# Patient Record
Sex: FEMALE | Race: WHITE | NOT HISPANIC OR LATINO | URBAN - METROPOLITAN AREA
[De-identification: names, ages, dates, MRNs, and addresses within clinical notes are randomized per-mention and may not be internally consistent; named-entity substitution may affect disease eponyms.]

---

## 2024-02-22 ENCOUNTER — HOSPITAL ENCOUNTER (OUTPATIENT)
Dept: MAMMOGRAPHY | Facility: CLINIC | Age: 54
Discharge: HOME | End: 2024-02-22
Attending: FAMILY MEDICINE | Admitting: FAMILY MEDICINE
Payer: COMMERCIAL

## 2024-02-22 DIAGNOSIS — Z12.31 VISIT FOR SCREENING MAMMOGRAM: ICD-10-CM

## 2024-02-22 PROCEDURE — 77063 BREAST TOMOSYNTHESIS BI: CPT

## 2024-06-21 ENCOUNTER — TRANSFERRED RECORDS (OUTPATIENT)
Dept: HEALTH INFORMATION MANAGEMENT | Facility: CLINIC | Age: 54
End: 2024-06-21
Payer: COMMERCIAL

## 2024-07-10 ENCOUNTER — MYC MEDICAL ADVICE (OUTPATIENT)
Dept: OBGYN | Facility: CLINIC | Age: 54
End: 2024-07-10

## 2024-07-16 ENCOUNTER — OFFICE VISIT (OUTPATIENT)
Dept: UROLOGY | Facility: CLINIC | Age: 54
End: 2024-07-16
Attending: OBSTETRICS & GYNECOLOGY
Payer: COMMERCIAL

## 2024-07-16 VITALS
WEIGHT: 206.1 LBS | HEIGHT: 69 IN | HEART RATE: 55 BPM | BODY MASS INDEX: 30.53 KG/M2 | SYSTOLIC BLOOD PRESSURE: 137 MMHG | DIASTOLIC BLOOD PRESSURE: 85 MMHG

## 2024-07-16 DIAGNOSIS — N32.81 OVERACTIVE BLADDER: Primary | ICD-10-CM

## 2024-07-16 DIAGNOSIS — N39.3 FEMALE STRESS INCONTINENCE: ICD-10-CM

## 2024-07-16 PROCEDURE — 87086 URINE CULTURE/COLONY COUNT: CPT | Performed by: OBSTETRICS & GYNECOLOGY

## 2024-07-16 PROCEDURE — 99213 OFFICE O/P EST LOW 20 MIN: CPT | Performed by: OBSTETRICS & GYNECOLOGY

## 2024-07-16 PROCEDURE — 99204 OFFICE O/P NEW MOD 45 MIN: CPT | Performed by: OBSTETRICS & GYNECOLOGY

## 2024-07-16 RX ORDER — PROGESTERONE 200 MG/1
CAPSULE ORAL
COMMUNITY
Start: 2024-06-05

## 2024-07-16 RX ORDER — ESTRADIOL 0.05 MG/D
PATCH, EXTENDED RELEASE TRANSDERMAL
COMMUNITY
Start: 2024-05-09

## 2024-07-16 NOTE — PATIENT INSTRUCTIONS
Thank you for trusting us with your care!     If you need to contact us for questions about:  Symptoms, Scheduling & Medical Questions; Non-urgent (2-3 day response) Roberth message, Urgent (needing response today) [phone] (if after 3:30pm next day response)   Prescriptions: Please call your Pharmacy   Billing: [place] [phone] or [facility]:[phone]

## 2024-07-16 NOTE — LETTER
7/16/2024       RE: Amelia Weeks  [address]  [facility] [address]     Dear Colleague,    Thank you for referring your patient, Amelia Weeks, to the [facility] [place] at [facility]. Please see a copy of my visit note below.    July 16, 2024    Referring Provider: Referred Self, MD  No address on file    Primary Care Provider: Fanny Valladares    CC: Urinary urgency, frequency    HPI:  Amelia Weeks is a 53 year old G0 who presents for evaluation of urinary urgency and frequency for the last several months since she started transitioning to menopause. Got started on HRT which is helping her hotflashes and is recently started on antidepressant.   She has moved recently to [place] and says she has a lot of stress that she is dealing with. Denies dysuria or recurrent UTIs or gross hematuria. Denies difficulty voiding although she has urgency right after she voids. She has trained herself to defer her urgency and is voiding every 2 hours or so during the day and once at night. No urgency leaks but has noticed some wetness through out the day necessitating changing a poise pantiliner every few hours during the day.     Drinks about 60 oz of water and 1 can of carbonated drink per day    Pelvic Organ Prolapse Symptoms  No bulge or pressure    Gastrointestinal Symptoms:  Has chronic constipation which she has tried to manage with lots of water/fiber supplement/prunes etc. Bowel movement every 2 days or so and hard.     Sexual function/Pelvic floor pain/GYN:   Not sexually active. Denies any pelvic pain       Relevant Medical History:    Diabetes? no  High Blood pressure? no     Recurrent UTIs? no  Sleep Apnea? no  Obesity? Body mass index is 30.44 kg/m .  History of Blood clots? no  Other medical problems: none    Surgical History:      No past surgical history on file.    OB/Gyn History:  OB History   No obstetric history on file.        Medications/Vitamins/Supplements:   Current Outpatient Medications   Medication Sig Dispense Refill    estradiol (VIVELLE-DOT) 0.05 MG/24HR bi-weekly patch APPLY 1 PATCH TWICE A WEEK FOR 30 DAYS      progesterone (PROMETRIUM) 200 MG capsule        No current facility-administered medications for this visit.         Medical History:      No past medical history on file.  ROS  Social History    Social History     Socioeconomic History    Marital status: Single     Spouse name: Not on file    Number of children: Not on file    Years of education: Not on file    Highest education level: Not on file   Occupational History    Not on file   Tobacco Use    Smoking status: Not on file    Smokeless tobacco: Not on file   Substance and Sexual Activity    Alcohol use: Not on file    Drug use: Not on file    Sexual activity: Not on file   Other Topics Concern    Not on file   Social History Narrative    Not on file     Social Determinants of Health     Financial Resource Strain: Low Risk  (10/9/2023)    Received from [employer]    Overall Financial Resource Strain (CARDIA)     Difficulty of Paying Living Expenses: Not hard at all   Food Insecurity: No Food Insecurity (10/9/2023)    Received from [employer]    Hunger Vital Sign     Worried About Running Out of Food in the Last Year: Never true     Ran Out of Food in the Last Year: Never true   Transportation Needs: No Transportation Needs (10/9/2023)    Received from [employer]    PRAPARE - Transportation     Lack of Transportation (Medical): No     Lack of Transportation (Non-Medical): No   Physical Activity: Sufficiently Active (10/9/2023)    Received from [employer]    Exercise Vital Sign     Days of Exercise per Week: 5 days     Minutes of Exercise per Session: 40 min   Stress: No Stress Concern Present (10/9/2023)    Received from [employer]    [employer] - Occupational Stress Questionnaire     Feeling of Stress : Only a little   Social Connections:  "Moderately Integrated (10/9/2023)    Received from [employer]    Social Connection and Isolation Panel [NHANES]     Frequency of Communication with Friends and Family: Twice a week     Frequency of Social Gatherings with Friends and Family: Once a week     Attends Jew Services: More than 4 times per year     Active Member of Clubs or Organizations: Yes     Attends Club or Organization Meetings: More than 4 times per year     Marital Status: Never    Interpersonal Safety: Not on file   Housing Stability: Low Risk  (5/31/2022)    Received from [facility]    Housing Stability Vital Sign     Unable to Pay for Housing in the Last Year: No     Number of Places Lived in the Last Year: 2     In the last 12 months, was there a time when you did not have a steady place to sleep or slept in a shelter (including now)?: No       Family History  No family history on file.    Allergy    Not on File    No current outpatient medications on file.     No current facility-administered medications for this visit.       Physical Exam: /85   Pulse 55   Ht 1.753 m (5' 9\")   Wt 93.5 kg (206 lb 1.6 oz)   BMI 30.44 kg/m      There were no vitals taken for this visit. No LMP recorded. There is no height or weight on file to calculate BMI.    Gen:  is alert, comfortable in no acute distress,   Abdomen: Abdomen is soft, non-tender, non-distended,   Lungs: non-labored breathing.     Pelvic Exam:   Normal external female genitalia. The urethra was Normal.    Vagina: no atrophy, normal support, no abn discharge  Uterus: Normal size, non tender   Ovaries: No palpable mass   Vulva: No lesions, no pain   Rectal: Deferred     Pelvic floor strength: 2/5 kegels.    Pelvic floor muscles: no myalgia, able to relax pelvic floor muscles with digital biofeedback    POPQ EXAM FOR PROLAPSE SEVERITY  No prolapse    Voiding trial:    VOID 80 ml  PVR 11 mL by Bladder ultrasound      Labs:   No results found for: \"COLOR\", " "\"APPEARANCE\", \"URINEGLC\", \"URINEBILI\", \"URINEKETONE\", \"SG\", \"URINEPH\", \"PROTEIN\", \"UROBILINOGEN\", \"NITRITE\", \"LEUKEST\"  CBC RESULTS: No results for input(s): \"WBC\", \"RBC\", \"HGB\", \"HCT\", \"MCV\", \"MCH\", \"MCHC\", \"RDW\", \"PLT\" in the last 40900 hours.  @[facility]@    A/P: Amelia Weeks is a 53 year old F with   Amelia was seen today for consult for.    Diagnoses and all orders for this visit:    Overactive bladder  -     Physical Therapy  Referral; Future  -     Urine Culture Aerobic Bacterial    Female stress incontinence  -     Physical Therapy  Referral; Future  -     Urine Culture Aerobic Bacterial      Normal pelvic exam   Normal post void residual ( no retention)  She is open to doing pelvic PT for her OAB and CHANELLE. Referral placed  Discussed about the association between stress/anxiety and OAB . She says she has good support system, doing yoga and starting to exercise which should help. Discussed other stress management options such as meditation  Will check urine for infection           I spent a total of 45 minutes with  Amelia Weeks  on the date of the encounter in chart review, face to face patient visit, review of tests, documentation and/or discussion with other providers about the issues documented above.     Melani Brown MD, [facility]  , Department of OBGYN  Female Pelvic Medicine and Reconstructive Surgery ( Urogynecology)  CC  Patient Care Team:  Fanny Valladares MD as PCP - General (Family Medicine)  Lexie Haney MD as MD (Dermatology)  SELF, REFERRED  "

## 2024-07-16 NOTE — PROGRESS NOTES
July 16, 2024    Referring Provider: Referred Self, MD  No address on file    Primary Care Provider: Fanny Valladares    CC: Urinary urgency, frequency    HPI:  Amelia Weeks is a 53 year old G0 who presents for evaluation of urinary urgency and frequency for the last several months since she started transitioning to menopause. Got started on HRT which is helping her hotflashes and is recently started on antidepressant.   She has moved recently to [place] and says she has a lot of stress that she is dealing with. Denies dysuria or recurrent UTIs or gross hematuria. Denies difficulty voiding although she has urgency right after she voids. She has trained herself to defer her urgency and is voiding every 2 hours or so during the day and once at night. No urgency leaks but has noticed some wetness through out the day necessitating changing a poise pantiliner every few hours during the day.     Drinks about 60 oz of water and 1 can of carbonated drink per day    Pelvic Organ Prolapse Symptoms  No bulge or pressure    Gastrointestinal Symptoms:  Has chronic constipation which she has tried to manage with lots of water/fiber supplement/prunes etc. Bowel movement every 2 days or so and hard.     Sexual function/Pelvic floor pain/GYN:   Not sexually active. Denies any pelvic pain       Relevant Medical History:    Diabetes? no  High Blood pressure? no     Recurrent UTIs? no  Sleep Apnea? no  Obesity? Body mass index is 30.44 kg/m .  History of Blood clots? no  Other medical problems: none    Surgical History:      No past surgical history on file.    OB/Gyn History:  OB History   No obstetric history on file.       Medications/Vitamins/Supplements:   Current Outpatient Medications   Medication Sig Dispense Refill    estradiol (VIVELLE-DOT) 0.05 MG/24HR bi-weekly patch APPLY 1 PATCH TWICE A WEEK FOR 30 DAYS      progesterone (PROMETRIUM) 200 MG capsule        No current facility-administered medications for this visit.          Medical History:      No past medical history on file.  ROS  Social History    Social History     Socioeconomic History    Marital status: Single     Spouse name: Not on file    Number of children: Not on file    Years of education: Not on file    Highest education level: Not on file   Occupational History    Not on file   Tobacco Use    Smoking status: Not on file    Smokeless tobacco: Not on file   Substance and Sexual Activity    Alcohol use: Not on file    Drug use: Not on file    Sexual activity: Not on file   Other Topics Concern    Not on file   Social History Narrative    Not on file     Social Determinants of Health     Financial Resource Strain: Low Risk  (10/9/2023)    Received from [employer]    Overall Financial Resource Strain (CARDIA)     Difficulty of Paying Living Expenses: Not hard at all   Food Insecurity: No Food Insecurity (10/9/2023)    Received from [employer]    Hunger Vital Sign     Worried About Running Out of Food in the Last Year: Never true     Ran Out of Food in the Last Year: Never true   Transportation Needs: No Transportation Needs (10/9/2023)    Received from [employer]    PRAPARE - Transportation     Lack of Transportation (Medical): No     Lack of Transportation (Non-Medical): No   Physical Activity: Sufficiently Active (10/9/2023)    Received from [employer]    Exercise Vital Sign     Days of Exercise per Week: 5 days     Minutes of Exercise per Session: 40 min   Stress: No Stress Concern Present (10/9/2023)    Received from [employer]    [employer] - Occupational Stress Questionnaire     Feeling of Stress : Only a little   Social Connections: Moderately Integrated (10/9/2023)    Received from [employer]    Social Connection and Isolation Panel [NHANES]     Frequency of Communication with Friends and Family: Twice a week     Frequency of Social Gatherings with Friends and Family: Once a week     Attends Congregational Services: More than 4 times per year      "Active Member of Clubs or Organizations: Yes     Attends Club or Organization Meetings: More than 4 times per year     Marital Status: Never    Interpersonal Safety: Not on file   Housing Stability: Low Risk  (5/31/2022)    Received from [facility]    Housing Stability Vital Sign     Unable to Pay for Housing in the Last Year: No     Number of Places Lived in the Last Year: 2     In the last 12 months, was there a time when you did not have a steady place to sleep or slept in a shelter (including now)?: No       Family History  No family history on file.    Allergy    Not on File    No current outpatient medications on file.     No current facility-administered medications for this visit.       Physical Exam: /85   Pulse 55   Ht 1.753 m (5' 9\")   Wt 93.5 kg (206 lb 1.6 oz)   BMI 30.44 kg/m      There were no vitals taken for this visit. No LMP recorded. There is no height or weight on file to calculate BMI.    Gen:  is alert, comfortable in no acute distress,   Abdomen: Abdomen is soft, non-tender, non-distended,   Lungs: non-labored breathing.     Pelvic Exam:   Normal external female genitalia. The urethra was Normal.    Vagina: no atrophy, normal support, no abn discharge  Uterus: Normal size, non tender   Ovaries: No palpable mass   Vulva: No lesions, no pain   Rectal: Deferred     Pelvic floor strength: 2/5 kegels.    Pelvic floor muscles: no myalgia, able to relax pelvic floor muscles with digital biofeedback    POPQ EXAM FOR PROLAPSE SEVERITY  No prolapse    Voiding trial:    VOID 80 ml  PVR 11 mL by Bladder ultrasound      Labs:   No results found for: \"COLOR\", \"APPEARANCE\", \"URINEGLC\", \"URINEBILI\", \"URINEKETONE\", \"SG\", \"URINEPH\", \"PROTEIN\", \"UROBILINOGEN\", \"NITRITE\", \"LEUKEST\"  CBC RESULTS: No results for input(s): \"WBC\", \"RBC\", \"HGB\", \"HCT\", \"MCV\", \"MCH\", \"MCHC\", \"RDW\", \"PLT\" in the last 37038 hours.  @lastp@    A/P: Amelia B Desi is a 53 year old F with   Amelia haney " seen today for consult for.    Diagnoses and all orders for this visit:    Overactive bladder  -     Physical Therapy  Referral; Future  -     Urine Culture Aerobic Bacterial    Female stress incontinence  -     Physical Therapy  Referral; Future  -     Urine Culture Aerobic Bacterial      Normal pelvic exam   Normal post void residual ( no retention)  She is open to doing pelvic PT for her OAB and CHANELLE. Referral placed  Discussed about the association between stress/anxiety and OAB . She says she has good support system, doing yoga and starting to exercise which should help. Discussed other stress management options such as meditation  Will check urine for infection           I spent a total of 45 minutes with  Amelia Weeks  on the date of the encounter in chart review, face to face patient visit, review of tests, documentation and/or discussion with other providers about the issues documented above.     Melani Brown MD, [facility]  , Department of OBGYN  Female Pelvic Medicine and Reconstructive Surgery ( Urogynecology)  CC  Patient Care Team:  Fanny Valladares MD as PCP - General (Family Medicine)  Lexie Haney MD as MD (Dermatology)  SELF, REFERRED

## 2024-07-18 LAB — BACTERIA UR CULT: NORMAL

## 2024-07-21 ENCOUNTER — MYC MEDICAL ADVICE (OUTPATIENT)
Dept: UROLOGY | Facility: CLINIC | Age: 54
End: 2024-07-21
Payer: COMMERCIAL

## 2024-09-04 ENCOUNTER — OFFICE VISIT (OUTPATIENT)
Dept: DERMATOLOGY | Facility: CLINIC | Age: 54
End: 2024-09-04
Payer: COMMERCIAL

## 2024-09-04 DIAGNOSIS — L82.0 INFLAMED SEBORRHEIC KERATOSIS: Primary | ICD-10-CM

## 2024-09-04 DIAGNOSIS — L81.4 LENTIGO: ICD-10-CM

## 2024-09-04 DIAGNOSIS — D18.01 CHERRY ANGIOMA: ICD-10-CM

## 2024-09-04 DIAGNOSIS — D22.9 MULTIPLE NEVI: ICD-10-CM

## 2024-09-04 PROCEDURE — 17110 DESTRUCTION B9 LES UP TO 14: CPT | Performed by: DERMATOLOGY

## 2024-09-04 PROCEDURE — 99203 OFFICE O/P NEW LOW 30 MIN: CPT | Mod: 25 | Performed by: DERMATOLOGY

## 2024-09-04 RX ORDER — ESCITALOPRAM OXALATE 10 MG/1
10 TABLET ORAL DAILY
COMMUNITY

## 2024-09-04 RX ORDER — OMEGA-3 FATTY ACIDS/FISH OIL 300-1000MG
CAPSULE ORAL
COMMUNITY

## 2024-09-04 RX ORDER — LORAZEPAM 0.5 MG
TABLET ORAL
COMMUNITY

## 2024-09-04 RX ORDER — UREA 10 %
500 LOTION (ML) TOPICAL DAILY
COMMUNITY

## 2024-09-04 ASSESSMENT — PAIN SCALES - GENERAL: PAINLEVEL: NO PAIN (0)

## 2024-09-04 NOTE — PATIENT INSTRUCTIONS
Cryotherapy    What is it?  Use of a very cold liquid, such as liquid nitrogen, to freeze and destroy abnormal skin cells that need to be removed    What should I expect?  Tenderness and redness  A small blister that might grow and fill with dark purple blood. There may be crusting.  More than one treatment may be needed if the lesions do not go away.    How do I care for the treated area?  Gently wash the area with your hands when bathing.  Use a thin layer of Vaseline to help with healing. You may use a Band-Aid.   The area should heal within 7-10 days and may leave behind a pink or lighter color.   Do not use an antibiotic or Neosporin ointment.   You may take acetaminophen (Tylenol) for pain.     Call your doctor if you have:  Severe pain  Signs of infection (warmth, redness, cloudy yellow drainage, and or a bad smell)  Questions or concerns    Who should I call with questions?      [facility]: [phone]      [facility]: [phone]      For urgent needs outside of business hours call the [facility] at [phone] and ask for the dermatology resident on call

## 2024-09-04 NOTE — NURSING NOTE
Dermatology Rooming Note    Amelia Weeks's goals for this visit include:   Chief Complaint   Patient presents with    Derm Problem     FBSE- New spots that have appeared. Areas of concern.  Brown spot near front right hairline, and left trunk near bra line. Has had the trunk spot frozen before but it came back.        Bradley You, EMT  Clinic Support  [facility]     [phone]    Employed by [facility]

## 2024-09-04 NOTE — LETTER
9/4/2024       RE: Amelia Weeks  [address]  [place] [address]     Dear Colleague,    Thank you for referring your patient, Amelia Weeks, to the [facility]ERMATOLOG[facility] [place] at [facility]. Please see a copy of my visit note below.    [facility] Dermatology Note  Encounter Date: Sep 4, 2024  Office Visit     Dermatology Problem List:  1. Nevi    ____________________________________________    Assessment & Plan:     # Inflamed SK right scalpline and left flank.   - Cryotherapy performed today (see procedure note(s) below).      # Benign findings: nevi, cherry angiomas, seborrheic keratoses, and lentigo  - continue sun protection    # Hemorrhage right first toenail- unable to evaluate today due to nail polish.  Discussed may be related to trauma.  Recommend remove nail polish at home and photograph.  If hemorrhage is not growing out, she should see her dermatologist in [place].       Procedures Performed:   - Cryotherapy procedure note, location(s): right scalp line and left flank. After verbal consent and discussion of risks and benefits including, but not limited to, dyspigmentation/scar, blister, and pain, 2 lesion(s) was(were) treated with 1-2 mm freeze border for 1-2 cycles with liquid nitrogen. Post cryotherapy instructions were provided.      Follow-up: as need.  She is moving in November to [place]    Staff:     Lexie Haney MD  ____________________________________________    CC: Derm Problem (FBSE- New spots that have appeared. Areas of concern.  Brown spot near front right hairline, and left trunk near bra line. Has had the trunk spot frozen before but it came back. /)    HPI:  Ms. Amelia Weeks is a(n) 54 year old female who presents today as a new patient for a skin check.  Has been followed in past by dermatology in [place] and will be moving back soon.  Had sun exposure in youth but no personal  history of skin cancer. Father recently had Mohs surgery for BCC of face.  Notes a crusty spot on the left flank that was treated in [place] but has recurred.  Notes a new spot on hair line.      Patient is otherwise feeling well, without additional skin concerns.     Labs Reviewed:  N/A    Physical Exam:  Vitals: There were no vitals taken for this visit.  SKIN: Total skin excluding the undergarment areas was performed. The exam included the head/face, neck, both arms, chest, back, abdomen, both legs, digits and/or nails.   - crusted stuck on papules with no atypia right scalp line and left flank  - nevi with no atypia on dermoscopy  - cherry angiomas  - lentigo  - waxy stuck on papules  - No other lesions of concern on areas examined.     Medications:  Current Outpatient Medications   Medication Sig Dispense Refill     cholecalciferol ( ULTRA STRENGTH) 50 MCG (2000 UT) CAPS        cyanocobalamin (VITAMIN B-12) 500 MCG tablet Take 500 mcg by mouth daily.       escitalopram (LEXAPRO) 10 MG tablet 10 mg daily.       estradiol (VIVELLE-DOT) 0.05 MG/24HR bi-weekly patch 0.       metFORMIN (GLUCOPHAGE) 850 MG tablet 850 mg 2 times daily (with meals).       omega 3 1000 MG CAPS Take by mouth.       progesterone (PROMETRIUM) 200 MG capsule        No current facility-administered medications for this visit.      Past Medical History:   There is no problem list on file for this patient.    Past Medical History:   Diagnosis Date     Constipation        CC Referred Self, MD  No address on file on close of this encounter.      Again, thank you for allowing me to participate in the care of your patient.      Sincerely,    Lexie Haney MD

## 2024-09-04 NOTE — PROGRESS NOTES
[facility] Dermatology Note  Encounter Date: Sep 4, 2024  Office Visit     Dermatology Problem List:  1. Nevi    ____________________________________________    Assessment & Plan:     # Inflamed SK right scalpline and left flank.   - Cryotherapy performed today (see procedure note(s) below).      # Benign findings: nevi, cherry angiomas, seborrheic keratoses, and lentigo  - continue sun protection    # Hemorrhage right first toenail- unable to evaluate today due to nail polish.  Discussed may be related to trauma.  Recommend remove nail polish at home and photograph.  If hemorrhage is not growing out, she should see her dermatologist in [place].       Procedures Performed:   - Cryotherapy procedure note, location(s): right scalp line and left flank. After verbal consent and discussion of risks and benefits including, but not limited to, dyspigmentation/scar, blister, and pain, 2 lesion(s) was(were) treated with 1-2 mm freeze border for 1-2 cycles with liquid nitrogen. Post cryotherapy instructions were provided.      Follow-up: as need.  She is moving in November to [place]    Staff:     Lexie Haney MD  ____________________________________________    CC: Derm Problem (FBSE- New spots that have appeared. Areas of concern.  Brown spot near front right hairline, and left trunk near bra line. Has had the trunk spot frozen before but it came back. /)    HPI:  Ms. Amelia Weeks is a(n) 54 year old female who presents today as a new patient for a skin check.  Has been followed in past by dermatology in [place] and will be moving back soon.  Had sun exposure in youth but no personal history of skin cancer. Father recently had Mohs surgery for BCC of face.  Notes a crusty spot on the left flank that was treated in [place] but has recurred.  Notes a new spot on hair line.      Patient is otherwise feeling well, without additional skin concerns.     Labs Reviewed:  N/A    Physical Exam:  Vitals:  There were no vitals taken for this visit.  SKIN: Total skin excluding the undergarment areas was performed. The exam included the head/face, neck, both arms, chest, back, abdomen, both legs, digits and/or nails.   - crusted stuck on papules with no atypia right scalp line and left flank  - nevi with no atypia on dermoscopy  - cherry angiomas  - lentigo  - waxy stuck on papules  - No other lesions of concern on areas examined.     Medications:  Current Outpatient Medications   Medication Sig Dispense Refill    cholecalciferol ( ULTRA STRENGTH) 50 MCG (2000 UT) CAPS       cyanocobalamin (VITAMIN B-12) 500 MCG tablet Take 500 mcg by mouth daily.      escitalopram (LEXAPRO) 10 MG tablet 10 mg daily.      estradiol (VIVELLE-DOT) 0.05 MG/24HR bi-weekly patch 0.      metFORMIN (GLUCOPHAGE) 850 MG tablet 850 mg 2 times daily (with meals).      omega 3 1000 MG CAPS Take by mouth.      progesterone (PROMETRIUM) 200 MG capsule        No current facility-administered medications for this visit.      Past Medical History:   There is no problem list on file for this patient.    Past Medical History:   Diagnosis Date    Constipation        CC Referred Self, MD  No address on file on close of this encounter.

## 2024-09-10 NOTE — TELEPHONE ENCOUNTER
DIAGNOSIS: L elbow pain, self referred, no images, [facility]     APPOINTMENT DATE: 9.11.24   NOTES STATUS DETAILS   MEDICATION LIST Internal

## 2024-09-11 ENCOUNTER — PRE VISIT (OUTPATIENT)
Dept: ORTHOPEDICS | Facility: CLINIC | Age: 54
End: 2024-09-11

## 2024-09-11 ENCOUNTER — OFFICE VISIT (OUTPATIENT)
Dept: ORTHOPEDICS | Facility: CLINIC | Age: 54
End: 2024-09-11
Payer: COMMERCIAL

## 2024-09-11 DIAGNOSIS — M77.12 LATERAL EPICONDYLITIS OF LEFT ELBOW: Primary | ICD-10-CM

## 2024-09-11 PROCEDURE — 20605 DRAIN/INJ JOINT/BURSA W/O US: CPT | Mod: LT | Performed by: FAMILY MEDICINE

## 2024-09-11 PROCEDURE — 99203 OFFICE O/P NEW LOW 30 MIN: CPT | Mod: 25 | Performed by: FAMILY MEDICINE

## 2024-09-11 RX ORDER — LIDOCAINE HYDROCHLORIDE 10 MG/ML
2 INJECTION, SOLUTION EPIDURAL; INFILTRATION; INTRACAUDAL; PERINEURAL
Status: SHIPPED | OUTPATIENT
Start: 2024-09-11

## 2024-09-11 RX ORDER — TRIAMCINOLONE ACETONIDE 40 MG/ML
20 INJECTION, SUSPENSION INTRA-ARTICULAR; INTRAMUSCULAR
Status: SHIPPED | OUTPATIENT
Start: 2024-09-11

## 2024-09-11 RX ADMIN — TRIAMCINOLONE ACETONIDE 20 MG: 40 INJECTION, SUSPENSION INTRA-ARTICULAR; INTRAMUSCULAR at 10:02

## 2024-09-11 RX ADMIN — LIDOCAINE HYDROCHLORIDE 2 ML: 10 INJECTION, SOLUTION EPIDURAL; INFILTRATION; INTRACAUDAL; PERINEURAL at 10:02

## 2024-09-11 NOTE — LETTER
"  2024      RE: Amelia Weeks  [address]  [facility] [address]     Dear Colleague,    Thank you for referring your patient, Amelia Weeks, to the [facility] [place]. Please see a copy of my visit note below.    Left elbow pain    Today, the patient reports left elbow pain for at least 6 months, states she thinks it is \"computer elbow\" she had this 1.5 years ago and had a cortisone injection for this. 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 The pain is located over the lateral elbow. Pain is worse with computer use, lifting, supination, she does hold her phone in the left hand. She is right hand dominant. She has not been to hand therapy for the left elbow, but did attend for the right side. She did also use a right wrist brace and changed to an ergonomic mouse.     She suspects that the elbow may have been bothered by lifting objects in her home while moving things repetitively up multiple flights of stairs 6 months ago.  They also could have been bothered by the positions that she holds hand-held electronic devices, propped in bed.            PMH:  Past Medical History:   Diagnosis Date     Constipation        Active problem list:  There is no problem list on file for this patient.      FH:  Family History   Problem Relation Age of Onset     Skin Cancer Father        SH:  Social History     Socioeconomic History     Marital status: Single     Spouse name: Not on file     Number of children: Not on file     Years of education: Not on file     Highest education level: Not on file   Occupational History     Not on file   Tobacco Use     Smoking status: Former     Current packs/day: 0.00     Average packs/day: 15.0 packs/day for 14.7 years (221.2 ttl pk-yrs)     Types: Cigarettes     Start date: 1990     Quit date: 2005     Years since quittin.2     Smokeless tobacco: Never   Substance and Sexual Activity     Alcohol use: Not Currently     Drug use: Never "     Sexual activity: Not Currently   Other Topics Concern     Parent/sibling w/ CABG, MI or angioplasty before 65F 55M? No   Social History Narrative     Not on file     Social Determinants of Health     Financial Resource Strain: Low Risk  (10/9/2023)    Received from [facility]    Overall Financial Resource Strain (CARDIA)      Difficulty of Paying Living Expenses: Not hard at all   Food Insecurity: No Food Insecurity (10/9/2023)    Received from [facility]    Hunger Vital Sign      Worried About Running Out of Food in the Last Year: Never true      Ran Out of Food in the Last Year: Never true   Transportation Needs: No Transportation Needs (10/9/2023)    Received from [facility]    PRAPARE - Transportation      Lack of Transportation (Medical): No      Lack of Transportation (Non-Medical): No   Physical Activity: Sufficiently Active (10/9/2023)    Received from [facility]    Exercise Vital Sign      Days of Exercise per Week: 5 days      Minutes of Exercise per Session: 40 min   Stress: No Stress Concern Present (10/9/2023)    Received from [facility]    [employer] [place] [employer] - Occupational Stress Questionnaire      Feeling of Stress : Only a little   Social Connections: Moderately Integrated (10/9/2023)    Received from [facility]    Social Connection and Isolation Panel [NHANES]      Frequency of Communication with Friends and Family: Twice a week      Frequency of Social Gatherings with Friends and Family: Once a week      Attends Mormon Services: More than 4 times per year      Active Member of Clubs or Organizations: Yes      Attends Club or Organization Meetings: More than 4 times per year      Marital Status: Never    Interpersonal Safety: Not on file   Housing Stability: Low Risk  (5/31/2022)    [facility]    Housing Stability Vital Sign      Unable to Pay for  Housing in the Last Year: No      Number of Places Lived in the Last Year: 2      Unstable Housing in the Last Year: No       MEDS:  See EMR, reviewed  ALL:  See EMR, reviewed    REVIEW OF SYSTEMS:  CONSTITUTIONAL:NEGATIVE for fever, chills, change in weight  INTEGUMENTARY/SKIN: NEGATIVE for worrisome rashes, moles or lesions  EYES: NEGATIVE for vision changes or irritation  ENT/MOUTH: NEGATIVE for ear, mouth and throat problems  RESP:NEGATIVE for significant cough or SOB  BREAST: NEGATIVE for masses, tenderness or discharge  CV: NEGATIVE for chest pain, palpitations or peripheral edema  GI: NEGATIVE for nausea, abdominal pain, heartburn, or change in bowel habits  :NEGATIVE for frequency, dysuria, or hematuria  :NEGATIVE for frequency, dysuria, or hematuria  NEURO: NEGATIVE for weakness, dizziness or paresthesias  ENDOCRINE: NEGATIVE for temperature intolerance, skin/hair changes  HEME/ALLERGY/IMMUNE: NEGATIVE for bleeding problems  PSYCHIATRIC: NEGATIVE for changes in mood or affect      Objective: Patient points to the lateral epicondyle on the left as the area of discomfort.  She is tender at the extensor carpi radialis brevis insertion and left wrist extension against resistance reproduces her discomfort.  Third finger extension strength is strong.  Nontender over the medial epicondyle ulnar groove or olecranon.  Full range of motion of the elbow to flexion extension.  Overlying skin is normal.  No palpable effusion.      Assessment: Left-sided lateral epicondylitis    Plan: We discussed conservative care including proper use of a counterforce brace with an additional use of a wrist extension limited brace over the next month.  We discussed ergonomics of the seated position and avoiding propping with hand-held devices in her bed.  Discussed that nonsteroidal anti-inflammatories do not cause this to heal and that cortisone shots have not been shown to cause healing of lateral epicondylitis.  After this  discussion, she would like to try a single cortisone shot for pain relief and agrees to purchase over-the-counter braces and use them consistently over the next month.  After informed consent about bleeding, infection, steroid flare, fat necrosis, loss of pigmentation, she was injected at the lateral epicondyle of the left elbow with a half a cc of Kenalog 40 and 2 cc of 1% lidocaine, with multiple passes made with a needle, the tender attachment point at the lateral epicondyle.  She tolerated the injection without issue.  Will follow-up as needed.    Medium Joint Injection/lateral elbw tendon sheath    Date/Time: 9/11/2024 10:02 AM    Performed by: Homer Goins MD  Authorized by: Homer Goins MD    Indications:  Pain  Needle Size:  25 G  Guidance: surface landmarks    Approach:  Lateral  Location:  Elbow  Location comment:  Lateral elbow tendon sheath  Medications:  20 mg triamcinolone 40 MG/ML; 2 mL lidocaine (PF) 1 %  Outcome:  Tolerated well, no immediate complications  Procedure discussed: discussed risks, benefits, and alternatives    Consent Given by:  Patient  Timeout: timeout called immediately prior to procedure    Prep: patient was prepped and draped in usual sterile fashion                            Again, thank you for allowing me to participate in the care of your patient.      Sincerely,    Homer Goins MD

## 2024-09-11 NOTE — PROGRESS NOTES
"Left elbow pain    Today, the patient reports left elbow pain for at least 6 months, states she thinks it is \"computer elbow\" she had this 1.5 years ago and had a cortisone injection for this. 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 The pain is located over the lateral elbow. Pain is worse with computer use, lifting, supination, she does hold her phone in the left hand. She is right hand dominant. She has not been to hand therapy for the left elbow, but did attend for the right side. She did also use a right wrist brace and changed to an ergonomic mouse.     She suspects that the elbow may have been bothered by lifting objects in her home while moving things repetitively up multiple flights of stairs 6 months ago.  They also could have been bothered by the positions that she holds hand-held electronic devices, propped in bed.            PMH:  Past Medical History:   Diagnosis Date    Constipation        Active problem list:  There is no problem list on file for this patient.      FH:  Family History   Problem Relation Age of Onset    Skin Cancer Father        SH:  Social History     Socioeconomic History    Marital status: Single     Spouse name: Not on file    Number of children: Not on file    Years of education: Not on file    Highest education level: Not on file   Occupational History    Not on file   Tobacco Use    Smoking status: Former     Current packs/day: 0.00     Average packs/day: 15.0 packs/day for 14.7 years (221.2 ttl pk-yrs)     Types: Cigarettes     Start date: 1990     Quit date: 2005     Years since quittin.2    Smokeless tobacco: Never   Substance and Sexual Activity    Alcohol use: Not Currently    Drug use: Never    Sexual activity: Not Currently   Other Topics Concern    Parent/sibling w/ CABG, MI or angioplasty before 65F 55M? No   Social History Narrative    Not on file     Social Determinants of Health     Financial Resource Strain: Low Risk  (10/9/2023)    Received from " [facility]    Overall Financial Resource Strain (CARDIA)     Difficulty of Paying Living Expenses: Not hard at all   Food Insecurity: No Food Insecurity (10/9/2023)    Received from [facility]    Hunger Vital Sign     Worried About Running Out of Food in the Last Year: Never true     Ran Out of Food in the Last Year: Never true   Transportation Needs: No Transportation Needs (10/9/2023)    Received from [facility]    PRAPARE - Transportation     Lack of Transportation (Medical): No     Lack of Transportation (Non-Medical): No   Physical Activity: Sufficiently Active (10/9/2023)    Received from [facility]    Exercise Vital Sign     Days of Exercise per Week: 5 days     Minutes of Exercise per Session: 40 min   Stress: No Stress Concern Present (10/9/2023)    Received from [facility]    [employer] [place] [employer] - Occupational Stress Questionnaire     Feeling of Stress : Only a little   Social Connections: Moderately Integrated (10/9/2023)    Received from [facility]    Social Connection and Isolation Panel [NHANES]     Frequency of Communication with Friends and Family: Twice a week     Frequency of Social Gatherings with Friends and Family: Once a week     Attends Shinto Services: More than 4 times per year     Active Member of Clubs or Organizations: Yes     Attends Club or Organization Meetings: More than 4 times per year     Marital Status: Never    Interpersonal Safety: Not on file   Housing Stability: Low Risk  (5/31/2022)    [facility]    Housing Stability Vital Sign     Unable to Pay for Housing in the Last Year: No     Number of Places Lived in the Last Year: 2     Unstable Housing in the Last Year: No       MEDS:  See EMR, reviewed  ALL:  See EMR, reviewed    REVIEW OF SYSTEMS:  CONSTITUTIONAL:NEGATIVE for fever, chills, change in weight  INTEGUMENTARY/SKIN: NEGATIVE for  worrisome rashes, moles or lesions  EYES: NEGATIVE for vision changes or irritation  ENT/MOUTH: NEGATIVE for ear, mouth and throat problems  RESP:NEGATIVE for significant cough or SOB  BREAST: NEGATIVE for masses, tenderness or discharge  CV: NEGATIVE for chest pain, palpitations or peripheral edema  GI: NEGATIVE for nausea, abdominal pain, heartburn, or change in bowel habits  :NEGATIVE for frequency, dysuria, or hematuria  :NEGATIVE for frequency, dysuria, or hematuria  NEURO: NEGATIVE for weakness, dizziness or paresthesias  ENDOCRINE: NEGATIVE for temperature intolerance, skin/hair changes  HEME/ALLERGY/IMMUNE: NEGATIVE for bleeding problems  PSYCHIATRIC: NEGATIVE for changes in mood or affect      Objective: Patient points to the lateral epicondyle on the left as the area of discomfort.  She is tender at the extensor carpi radialis brevis insertion and left wrist extension against resistance reproduces her discomfort.  Third finger extension strength is strong.  Nontender over the medial epicondyle ulnar groove or olecranon.  Full range of motion of the elbow to flexion extension.  Overlying skin is normal.  No palpable effusion.      Assessment: Left-sided lateral epicondylitis    Plan: We discussed conservative care including proper use of a counterforce brace with an additional use of a wrist extension limited brace over the next month.  We discussed ergonomics of the seated position and avoiding propping with hand-held devices in her bed.  Discussed that nonsteroidal anti-inflammatories do not cause this to heal and that cortisone shots have not been shown to cause healing of lateral epicondylitis.  After this discussion, she would like to try a single cortisone shot for pain relief and agrees to purchase over-the-counter braces and use them consistently over the next month.  After informed consent about bleeding, infection, steroid flare, fat necrosis, loss of pigmentation, she was injected at the  lateral epicondyle of the left elbow with a half a cc of Kenalog 40 and 2 cc of 1% lidocaine, with multiple passes made with a needle, the tender attachment point at the lateral epicondyle.  She tolerated the injection without issue.  Will follow-up as needed.    Medium Joint Injection/lateral elbw tendon sheath    Date/Time: 9/11/2024 10:02 AM    Performed by: Homer Goins MD  Authorized by: Homer Goins MD    Indications:  Pain  Needle Size:  25 G  Guidance: surface landmarks    Approach:  Lateral  Location:  Elbow  Location comment:  Lateral elbow tendon sheath  Medications:  20 mg triamcinolone 40 MG/ML; 2 mL lidocaine (PF) 1 %  Outcome:  Tolerated well, no immediate complications  Procedure discussed: discussed risks, benefits, and alternatives    Consent Given by:  Patient  Timeout: timeout called immediately prior to procedure    Prep: patient was prepped and draped in usual sterile fashion

## 2024-09-11 NOTE — NURSING NOTE
[address] [address]  Dept: [phone]  ______________________________________________________________________________    Patient: Amelia Weeks   : 1970   MRN: [id number]   2024    INVASIVE PROCEDURE SAFETY CHECKLIST    Date: 2024   Procedure:Left elbow lateral epicondylitis injection  Patient Name: Amelia Weeks  MRN: [id number]  YOB: 1970    Action: Complete sections as appropriate. Any discrepancy results in a HARD COPY until resolved.     PRE PROCEDURE:  Patient ID verified with 2 identifiers (name and  or MRN): Yes  Procedure and site verified with patient/designee (when able): Yes  Accurate consent documentation in medical record: Yes  H&P (or appropriate assessment) documented in medical record: Yes  H&P must be up to 20 days prior to procedure and updates within 24 hours of procedure as applicable: NA  Relevant diagnostic and radiology test results appropriately labeled and displayed as applicable: Yes  Procedure site(s) marked with provider initials: NA    TIMEOUT:  Time-Out performed immediately prior to starting procedure, including verbal and active participation of all team members addressing the following:Yes  * Correct patient identify  * Confirmed that the correct side and site are marked  * An accurate procedure consent form  * Agreement on the procedure to be done  * Correct patient position  * Relevant images and results are properly labeled and appropriately displayed  * The need to administer antibiotics or fluids for irrigation purposes during the procedure as applicable   * Safety precautions based on patient history or medication use    DURING PROCEDURE: Verification of correct person, site, and procedures any time the responsibility for care of the patient is transferred to another member of the care team.       Prior to injection, verified patient identity using  patient's name and date of birth.  Due to injection administration, patient instructed to remain in clinic for 15 minutes  afterwards, and to report any adverse reaction to me immediately.    Lateral elbow injection    Drug Amount Wasted:  Yes: 3 mg/ml lidocaine and .5 mg/ml kenalog  Vial/Syringe: Single dose vial  Expiration Date:  05/2027 lidocaine and 03/2026 kenalog       Nakia Goff, LIZ  September 11, 2024

## 2024-12-15 ENCOUNTER — HEALTH MAINTENANCE LETTER (OUTPATIENT)
Age: 54
End: 2024-12-15

## (undated) RX ORDER — LIDOCAINE HYDROCHLORIDE 10 MG/ML
INJECTION, SOLUTION EPIDURAL; INFILTRATION; INTRACAUDAL; PERINEURAL
Status: DISPENSED
Start: 2024-09-11

## (undated) RX ORDER — TRIAMCINOLONE ACETONIDE 40 MG/ML
INJECTION, SUSPENSION INTRA-ARTICULAR; INTRAMUSCULAR
Status: DISPENSED
Start: 2024-09-11